# Patient Record
Sex: MALE | Race: BLACK OR AFRICAN AMERICAN | NOT HISPANIC OR LATINO | Employment: UNEMPLOYED | URBAN - METROPOLITAN AREA
[De-identification: names, ages, dates, MRNs, and addresses within clinical notes are randomized per-mention and may not be internally consistent; named-entity substitution may affect disease eponyms.]

---

## 2020-01-07 ENCOUNTER — HOSPITAL ENCOUNTER (EMERGENCY)
Facility: HOSPITAL | Age: 50
Discharge: HOME | End: 2020-01-07
Attending: SURGERY

## 2020-01-07 VITALS
OXYGEN SATURATION: 98 % | HEART RATE: 55 BPM | WEIGHT: 144.81 LBS | TEMPERATURE: 97 F | DIASTOLIC BLOOD PRESSURE: 72 MMHG | BODY MASS INDEX: 19.64 KG/M2 | SYSTOLIC BLOOD PRESSURE: 117 MMHG | RESPIRATION RATE: 19 BRPM

## 2020-01-07 DIAGNOSIS — F19.10 SUBSTANCE ABUSE: Primary | ICD-10-CM

## 2020-01-07 LAB
ALBUMIN SERPL BCP-MCNC: 3.5 G/DL (ref 3.5–5.2)
ALP SERPL-CCNC: 87 U/L (ref 55–135)
ALT SERPL W/O P-5'-P-CCNC: 20 U/L (ref 10–44)
ANION GAP SERPL CALC-SCNC: 11 MMOL/L (ref 8–16)
AST SERPL-CCNC: 36 U/L (ref 10–40)
BASOPHILS # BLD AUTO: 0.03 K/UL (ref 0–0.2)
BASOPHILS NFR BLD: 0.7 % (ref 0–1.9)
BILIRUB SERPL-MCNC: 0.3 MG/DL (ref 0.1–1)
BUN SERPL-MCNC: 14 MG/DL (ref 6–20)
CALCIUM SERPL-MCNC: 8.3 MG/DL (ref 8.7–10.5)
CHLORIDE SERPL-SCNC: 105 MMOL/L (ref 95–110)
CK SERPL-CCNC: 659 U/L (ref 20–200)
CO2 SERPL-SCNC: 25 MMOL/L (ref 23–29)
CREAT SERPL-MCNC: 0.9 MG/DL (ref 0.5–1.4)
DIFFERENTIAL METHOD: ABNORMAL
EOSINOPHIL # BLD AUTO: 0 K/UL (ref 0–0.5)
EOSINOPHIL NFR BLD: 0.9 % (ref 0–8)
ERYTHROCYTE [DISTWIDTH] IN BLOOD BY AUTOMATED COUNT: 15.2 % (ref 11.5–14.5)
EST. GFR  (AFRICAN AMERICAN): >60 ML/MIN/1.73 M^2
EST. GFR  (NON AFRICAN AMERICAN): >60 ML/MIN/1.73 M^2
GLUCOSE SERPL-MCNC: 123 MG/DL (ref 70–110)
HCT VFR BLD AUTO: 36.6 % (ref 40–54)
HGB BLD-MCNC: 11.9 G/DL (ref 14–18)
IMM GRANULOCYTES # BLD AUTO: 0.01 K/UL (ref 0–0.04)
IMM GRANULOCYTES NFR BLD AUTO: 0.2 % (ref 0–0.5)
LYMPHOCYTES # BLD AUTO: 0.9 K/UL (ref 1–4.8)
LYMPHOCYTES NFR BLD: 19.8 % (ref 18–48)
MCH RBC QN AUTO: 28.1 PG (ref 27–31)
MCHC RBC AUTO-ENTMCNC: 32.5 G/DL (ref 32–36)
MCV RBC AUTO: 86 FL (ref 82–98)
MONOCYTES # BLD AUTO: 0.5 K/UL (ref 0.3–1)
MONOCYTES NFR BLD: 10.2 % (ref 4–15)
NEUTROPHILS # BLD AUTO: 3.1 K/UL (ref 1.8–7.7)
NEUTROPHILS NFR BLD: 68.2 % (ref 38–73)
NRBC BLD-RTO: 0 /100 WBC
PLATELET # BLD AUTO: 233 K/UL (ref 150–350)
PMV BLD AUTO: 9 FL (ref 9.2–12.9)
POTASSIUM SERPL-SCNC: 3.2 MMOL/L (ref 3.5–5.1)
PROT SERPL-MCNC: 6.4 G/DL (ref 6–8.4)
RBC # BLD AUTO: 4.24 M/UL (ref 4.6–6.2)
SODIUM SERPL-SCNC: 141 MMOL/L (ref 136–145)
WBC # BLD AUTO: 4.5 K/UL (ref 3.9–12.7)

## 2020-01-07 PROCEDURE — 85025 COMPLETE CBC W/AUTO DIFF WBC: CPT

## 2020-01-07 PROCEDURE — 63600175 PHARM REV CODE 636 W HCPCS: Performed by: SURGERY

## 2020-01-07 PROCEDURE — 99284 EMERGENCY DEPT VISIT MOD MDM: CPT | Mod: 25

## 2020-01-07 PROCEDURE — 80053 COMPREHEN METABOLIC PANEL: CPT

## 2020-01-07 PROCEDURE — 36415 COLL VENOUS BLD VENIPUNCTURE: CPT

## 2020-01-07 PROCEDURE — 82550 ASSAY OF CK (CPK): CPT

## 2020-01-07 PROCEDURE — 96360 HYDRATION IV INFUSION INIT: CPT

## 2020-01-07 RX ORDER — SODIUM CHLORIDE 9 MG/ML
1000 INJECTION, SOLUTION INTRAVENOUS
Status: COMPLETED | OUTPATIENT
Start: 2020-01-07 | End: 2020-01-07

## 2020-01-07 RX ADMIN — SODIUM CHLORIDE 1000 ML: 0.9 INJECTION, SOLUTION INTRAVENOUS at 02:01

## 2020-01-07 NOTE — ED NOTES
NAD noted.  Hourly rounding performed: pain unchanged, plan of care, and possible time of disposition discussed.  Cont to monitor.  Awaiting provider evaluation.  Pt offers no additional complaints @ this time. Pt reports he is ready to go home, MD notified.

## 2020-01-07 NOTE — ED NOTES
Pt given urine speciman cup, zeeshan soap towelettewipe, and instructions for [facility]; understanding verbalized

## 2020-01-07 NOTE — ED PROVIDER NOTES
Encounter Date: 1/7/2020       History     Chief Complaint   Patient presents with    Seizures     possible     Patient is 49-year-old black male who was found reportedly drinking and possibly abusing substances, witnesses say he fell off of a stool and was lethargic.  He has a history of seizure disorder but no actual documented seizure was witnessed according to the EMS personnel.  He has a history of alcohol and substance abuse.        Review of patient's allergies indicates:  No Known Allergies  Past Medical History:   Diagnosis Date    GERD (gastroesophageal reflux disease)      History reviewed. No pertinent surgical history.  History reviewed. No pertinent family history.  Social History     Tobacco Use    Smoking status: Current Every Day Smoker     Packs/day: 0.50     Years: 15.00     Pack years: 7.50     Types: Cigarettes    Smokeless tobacco: Never Used   Substance Use Topics    Alcohol use: Yes     Comment: once a month    Drug use: No     Review of Systems   Unable to perform ROS: Acuity of condition       Physical Exam     Initial Vitals [01/07/20 1427]   BP Pulse Resp Temp SpO2   118/71 75 20 97.1 °F (36.2 °C) 97 %      MAP       --         Physical Exam    Nursing note and vitals reviewed.  Constitutional: He appears well-developed and well-nourished.   HENT:   Head: Normocephalic and atraumatic.   Eyes: EOM are normal. Pupils are equal, round, and reactive to light.   Neck: Normal range of motion. Neck supple.   Cardiovascular: Normal rate.   Pulmonary/Chest: Breath sounds normal. No respiratory distress. He has no wheezes.   Abdominal: Soft. He exhibits no distension. There is no tenderness.   Musculoskeletal: Normal range of motion.   Neurological: He is alert and oriented to person, place, and time.   Skin: Skin is warm and dry. Capillary refill takes less than 2 seconds. No pallor.   Psychiatric: He has a normal mood and affect. Thought content normal.         ED Course   Procedures  Labs  Reviewed - No data to display       Imaging Results    None                                          Clinical Impression:       ICD-10-CM ICD-9-CM   1. Substance abuse F19.10 305.90         Disposition:   Disposition: Discharged  Condition: Stable                     Arron Meier Jr., MD  01/07/20 2158

## 2020-01-07 NOTE — ED TRIAGE NOTES
49 y.o. male presents to ER ED 01/ED 01A   Chief Complaint   Patient presents with    Seizures     possible   Pt brought to ER after witnesses report patient fell out of chair and began having seizure like activity. EMS reports possible ETOH intoxication. Pt AAOx4. No acute distress noted.

## 2020-12-07 ENCOUNTER — HOSPITAL ENCOUNTER (EMERGENCY)
Facility: HOSPITAL | Age: 50
Discharge: HOME | End: 2020-12-07
Attending: SURGERY

## 2020-12-07 VITALS
OXYGEN SATURATION: 98 % | DIASTOLIC BLOOD PRESSURE: 80 MMHG | BODY MASS INDEX: 21.06 KG/M2 | RESPIRATION RATE: 18 BRPM | WEIGHT: 150.44 LBS | TEMPERATURE: 98 F | HEIGHT: 71 IN | SYSTOLIC BLOOD PRESSURE: 116 MMHG | HEART RATE: 88 BPM

## 2020-12-07 DIAGNOSIS — B35.3 TINEA PEDIS OF RIGHT FOOT: Primary | ICD-10-CM

## 2020-12-07 PROCEDURE — 99284 EMERGENCY DEPT VISIT MOD MDM: CPT

## 2020-12-07 RX ORDER — FLUCONAZOLE 100 MG/1
100 TABLET ORAL DAILY
Qty: 5 TABLET | Refills: 0 | Status: SHIPPED | OUTPATIENT
Start: 2020-12-07 | End: 2020-12-12

## 2020-12-07 NOTE — ED PROVIDER NOTES
[facility]                                                 I reviewed the ER triage nurse's note before evaluating the patient    Chief Complaint  50 y.o. male with Foot Swelling (50 yr old male to ED with c/o right foot swelling and pain. )      History of Present Illness  Osmani Mcmahon presents to the emergency room with toe rash/irritation  States he has a toe rash in irritation and his right foot area this past wk  The patient on exam has uncomplicated athlete's foot in his web spaces  Patient has no evidence of cellulitis, no history of diabetes noted today  Neuro intact with good distal pulses, admits to poor foot washing habits    The history is provided by the patient   device was not used during this ER visit  Medical history:  GERD  History reviewed. No pertinent surgical history.   No Known Allergies     I have reviewed all of this patient's past medical, surgical, family, and social   histories as well as active allergies and medications documented in the  electronic medical record    Review of Systems and Physical Exam      Review of Systems  -- Constitution - no fever, denies fatigue, no weakness, no chills  -- Eyes - no tearing or redness, no visual disturbance  -- Ear, Nose - no tinnitus or earache, no nasal congestion or discharge  -- Mouth,Throat - no sore throat, no toothache, normal voice, normal swallowing  -- Respiratory - denies cough and congestion, no shortness of breath, no EVANS  -- Cardiovascular - denies chest pain, no palpitations, denies claudication  -- Gastrointestinal - denies abdominal pain, nausea, vomiting, or diarrhea  -- Genitourinary - no dysuria, denies flank pain, no hematuria, no STD risk  -- Musculoskeletal - denies back pain, negative for trauma or injury  -- Neurological - no headache, denies weakness or seizure; no LOC  -- Skin - rash on the right foot    Vital Signs  His oral temperature is 97.6 °F (36.4 °C).   His blood  pressure is 116/80 and his pulse is 88.   His respiration is 18 and oxygen saturation is 98%.     Physical Exam  -- Nursing note and vitals reviewed  -- Constitutional: Appears well-developed and well-nourished  -- Head: Atraumatic. Normocephalic. No obvious abnormality  -- Eyes: Pupils are equal and reactive to light. Normal conjunctiva and lids  -- Cardiac: Normal rate, regular rhythm and normal heart sounds  -- Pulmonary: Normal respiratory effort, breath sounds clear to auscultation  -- Abdominal: Soft, no tenderness. Normal bowel sounds. Normal liver edge  -- Musculoskeletal: Normal range of motion, no effusions. Joints stable   -- Neurological: No focal deficits. Showed good interaction with staff  -- Vascular: Posterior tibial, dorsalis pedis and radial pulses 2+ bilaterally    -- Lymphatics: No cervical or peripheral lymphadenopathy. No edema noted  -- Skin: Athlete's foot in the interdigit web spaces of the right foot    Emergency Room Course      ED Physician Management  -- Diagnosis management comments: 50 y.o. male with athlete's foot  -- patient with athlete's foot in the right interdigit web spaces on exam  -- patient has no signs of cellulitis complication or skin sloughing now  -- patient has poor foot hygiene based on presentation this morning  -- Diflucan and Lamisil prescribed on discharge, close follow-up needed  -- given local name and number of a physician for outpatient evaluation  -- return to the ER with any concerning symptoms after discharge today    Diagnosis  [B35.3] Tinea pedis of right foot (Primary)    Disposition and Plan  -- Disposition: home  -- Condition: stable  -- Follow-up: Patient to follow up with MD in 1-2 days.  -- I advised the patient that we have found no life threatening condition today  -- At this time, I believe the patient is clinically stable for discharge.   -- The patient acknowledges that close follow up with a MD is required   -- Patient agrees to comply with  all instruction and direction given in the ER    This note is dictated on M*Modal word recognition program.  There are word recognition mistakes that are occasionally missed on review.         Vinh Zhou MD  12/07/20 [id number]

## 2021-12-29 ENCOUNTER — PATIENT OUTREACH (OUTPATIENT)
Dept: ADMINISTRATIVE | Facility: HOSPITAL | Age: 51
End: 2021-12-29

## 2022-04-13 DIAGNOSIS — Z12.11 COLON CANCER SCREENING: ICD-10-CM

## 2023-06-02 ENCOUNTER — PATIENT OUTREACH (OUTPATIENT)
Dept: ADMINISTRATIVE | Facility: HOSPITAL | Age: 53
End: 2023-06-02

## 2025-03-27 PROBLEM — K92.0 HEMATEMESIS: Status: ACTIVE | Noted: 2025-03-27

## 2025-05-12 ENCOUNTER — HOSPITAL ENCOUNTER (EMERGENCY)
Facility: HOSPITAL | Age: 55
Discharge: HOME | End: 2025-05-12
Attending: SURGERY
Payer: MEDICAID

## 2025-05-12 VITALS
SYSTOLIC BLOOD PRESSURE: 179 MMHG | OXYGEN SATURATION: 100 % | HEART RATE: 62 BPM | WEIGHT: 157.5 LBS | TEMPERATURE: 98 F | HEIGHT: 73 IN | DIASTOLIC BLOOD PRESSURE: 100 MMHG | BODY MASS INDEX: 20.87 KG/M2 | RESPIRATION RATE: 20 BRPM

## 2025-05-12 DIAGNOSIS — K92.0 HEMATEMESIS: ICD-10-CM

## 2025-05-12 DIAGNOSIS — R06.02 SHORTNESS OF BREATH: ICD-10-CM

## 2025-05-12 LAB
ABSOLUTE EOSINOPHIL (OHS): 0.02 K/UL
ABSOLUTE EOSINOPHIL (OHS): 0.03 K/UL
ABSOLUTE MONOCYTE (OHS): 0.6 K/UL (ref 0.3–1)
ABSOLUTE MONOCYTE (OHS): 0.77 K/UL (ref 0.3–1)
ABSOLUTE NEUTROPHIL COUNT (OHS): 3.24 K/UL (ref 1.8–7.7)
ABSOLUTE NEUTROPHIL COUNT (OHS): 3.31 K/UL (ref 1.8–7.7)
ALBUMIN SERPL BCP-MCNC: 4.2 G/DL (ref 3.5–5.2)
ALP SERPL-CCNC: 81 UNIT/L (ref 40–150)
ALT SERPL W/O P-5'-P-CCNC: 21 UNIT/L (ref 10–44)
AMPHET UR QL SCN: ABNORMAL
ANION GAP (OHS): 9 MMOL/L (ref 8–16)
APTT PPP: 23.5 SECONDS (ref 21–32)
AST SERPL-CCNC: 32 UNIT/L (ref 11–45)
BACTERIA #/AREA URNS HPF: ABNORMAL /HPF
BARBITURATE SCN PRESENT UR: NEGATIVE
BASOPHILS # BLD AUTO: 0.03 K/UL
BASOPHILS # BLD AUTO: 0.04 K/UL
BASOPHILS NFR BLD AUTO: 0.6 %
BASOPHILS NFR BLD AUTO: 0.7 %
BENZODIAZ UR QL SCN: NEGATIVE
BILIRUB SERPL-MCNC: 1 MG/DL (ref 0.1–1)
BILIRUB UR QL STRIP.AUTO: ABNORMAL
BNP SERPL-MCNC: 17 PG/ML (ref 0–99)
BUN SERPL-MCNC: 9 MG/DL (ref 6–20)
CALCIUM SERPL-MCNC: 9.2 MG/DL (ref 8.7–10.5)
CANNABINOIDS UR QL SCN: NEGATIVE
CHLORIDE SERPL-SCNC: 101 MMOL/L (ref 95–110)
CLARITY UR: CLEAR
CO2 SERPL-SCNC: 29 MMOL/L (ref 23–29)
COCAINE UR QL SCN: ABNORMAL
COLOR UR AUTO: ABNORMAL
CREAT SERPL-MCNC: 0.8 MG/DL (ref 0.5–1.4)
CREAT UR-MCNC: 394.4 MG/DL (ref 23–375)
ERYTHROCYTE [DISTWIDTH] IN BLOOD BY AUTOMATED COUNT: 15.8 % (ref 11.5–14.5)
ERYTHROCYTE [DISTWIDTH] IN BLOOD BY AUTOMATED COUNT: 15.9 % (ref 11.5–14.5)
ETHANOL SERPL-MCNC: <10 MG/DL
GFR SERPLBLD CREATININE-BSD FMLA CKD-EPI: >60 ML/MIN/1.73/M2
GLUCOSE SERPL-MCNC: 100 MG/DL (ref 70–110)
GLUCOSE UR QL STRIP: NEGATIVE
HCT VFR BLD AUTO: 37.2 % (ref 40–54)
HCT VFR BLD AUTO: 39.7 % (ref 40–54)
HGB BLD-MCNC: 12.7 GM/DL (ref 14–18)
HGB BLD-MCNC: 13.3 GM/DL (ref 14–18)
HGB UR QL STRIP: ABNORMAL
HOLD SPECIMEN: NORMAL
HYALINE CASTS #/AREA URNS LPF: 0 /LPF (ref 0–1)
IMM GRANULOCYTES # BLD AUTO: 0.02 K/UL (ref 0–0.04)
IMM GRANULOCYTES # BLD AUTO: 0.02 K/UL (ref 0–0.04)
IMM GRANULOCYTES NFR BLD AUTO: 0.3 % (ref 0–0.5)
IMM GRANULOCYTES NFR BLD AUTO: 0.4 % (ref 0–0.5)
INFLUENZA A MOLECULAR (OHS): NEGATIVE
INFLUENZA B MOLECULAR (OHS): NEGATIVE
INR PPP: 1 (ref 0.8–1.2)
KETONES UR QL STRIP: ABNORMAL
LACTATE SERPL-SCNC: 1 MMOL/L (ref 0.5–2.2)
LEUKOCYTE ESTERASE UR QL STRIP: NEGATIVE
LIPASE SERPL-CCNC: 7 U/L (ref 4–60)
LYMPHOCYTES # BLD AUTO: 1.27 K/UL (ref 1–4.8)
LYMPHOCYTES # BLD AUTO: 1.78 K/UL (ref 1–4.8)
MCH RBC QN AUTO: 27.5 PG (ref 27–31)
MCH RBC QN AUTO: 28 PG (ref 27–31)
MCHC RBC AUTO-ENTMCNC: 33.5 G/DL (ref 32–36)
MCHC RBC AUTO-ENTMCNC: 34.1 G/DL (ref 32–36)
MCV RBC AUTO: 82 FL (ref 82–98)
MCV RBC AUTO: 82 FL (ref 82–98)
METHADONE UR QL SCN: NEGATIVE
MICROSCOPIC COMMENT: ABNORMAL
NITRITE UR QL STRIP: NEGATIVE
NUCLEATED RBC (/100WBC) (OHS): 0 /100 WBC
NUCLEATED RBC (/100WBC) (OHS): 0 /100 WBC
OPIATES UR QL SCN: NEGATIVE
PCP UR QL: NEGATIVE
PH UR STRIP: 7 [PH]
PLATELET # BLD AUTO: 222 K/UL (ref 150–450)
PLATELET # BLD AUTO: 240 K/UL (ref 150–450)
PMV BLD AUTO: 9.6 FL (ref 9.2–12.9)
PMV BLD AUTO: 9.8 FL (ref 9.2–12.9)
POTASSIUM SERPL-SCNC: 3 MMOL/L (ref 3.5–5.1)
PROCALCITONIN SERPL-MCNC: 0.02 NG/ML
PROT SERPL-MCNC: 7.7 GM/DL (ref 6–8.4)
PROT UR QL STRIP: ABNORMAL
PROTHROMBIN TIME: 10.9 SECONDS (ref 9–12.5)
RBC # BLD AUTO: 4.53 M/UL (ref 4.6–6.2)
RBC # BLD AUTO: 4.83 M/UL (ref 4.6–6.2)
RBC #/AREA URNS HPF: 15 /HPF (ref 0–4)
RELATIVE EOSINOPHIL (OHS): 0.4 %
RELATIVE EOSINOPHIL (OHS): 0.5 %
RELATIVE LYMPHOCYTE (OHS): 24.2 % (ref 18–48)
RELATIVE LYMPHOCYTE (OHS): 30.3 % (ref 18–48)
RELATIVE MONOCYTE (OHS): 11.4 % (ref 4–15)
RELATIVE MONOCYTE (OHS): 13.1 % (ref 4–15)
RELATIVE NEUTROPHIL (OHS): 55.1 % (ref 38–73)
RELATIVE NEUTROPHIL (OHS): 63 % (ref 38–73)
SARS-COV-2 RDRP RESP QL NAA+PROBE: NEGATIVE
SODIUM SERPL-SCNC: 139 MMOL/L (ref 136–145)
SP GR UR STRIP: 1.02
TROPONIN I SERPL DL<=0.01 NG/ML-MCNC: <0.006 NG/ML
UROBILINOGEN UR STRIP-ACNC: ABNORMAL EU/DL
WBC # BLD AUTO: 5.25 K/UL (ref 3.9–12.7)
WBC # BLD AUTO: 5.88 K/UL (ref 3.9–12.7)
WBC #/AREA URNS HPF: 2 /HPF (ref 0–5)

## 2025-05-12 PROCEDURE — 96365 THER/PROPH/DIAG IV INF INIT: CPT

## 2025-05-12 PROCEDURE — 25000003 PHARM REV CODE 250: Performed by: SURGERY

## 2025-05-12 PROCEDURE — 87040 BLOOD CULTURE FOR BACTERIA: CPT | Performed by: SURGERY

## 2025-05-12 PROCEDURE — 96366 THER/PROPH/DIAG IV INF ADDON: CPT

## 2025-05-12 PROCEDURE — 83605 ASSAY OF LACTIC ACID: CPT | Performed by: SURGERY

## 2025-05-12 PROCEDURE — 63600175 PHARM REV CODE 636 W HCPCS: Performed by: SURGERY

## 2025-05-12 PROCEDURE — 85730 THROMBOPLASTIN TIME PARTIAL: CPT | Performed by: SURGERY

## 2025-05-12 PROCEDURE — 80053 COMPREHEN METABOLIC PANEL: CPT | Performed by: SURGERY

## 2025-05-12 PROCEDURE — 81003 URINALYSIS AUTO W/O SCOPE: CPT | Mod: 59 | Performed by: SURGERY

## 2025-05-12 PROCEDURE — 80307 DRUG TEST PRSMV CHEM ANLYZR: CPT | Performed by: SURGERY

## 2025-05-12 PROCEDURE — 25500020 PHARM REV CODE 255: Performed by: SURGERY

## 2025-05-12 PROCEDURE — 84484 ASSAY OF TROPONIN QUANT: CPT | Performed by: SURGERY

## 2025-05-12 PROCEDURE — 87502 INFLUENZA DNA AMP PROBE: CPT | Performed by: SURGERY

## 2025-05-12 PROCEDURE — 85610 PROTHROMBIN TIME: CPT | Performed by: SURGERY

## 2025-05-12 PROCEDURE — 85025 COMPLETE CBC W/AUTO DIFF WBC: CPT | Performed by: SURGERY

## 2025-05-12 PROCEDURE — 84145 PROCALCITONIN (PCT): CPT | Performed by: SURGERY

## 2025-05-12 PROCEDURE — 83880 ASSAY OF NATRIURETIC PEPTIDE: CPT | Performed by: SURGERY

## 2025-05-12 PROCEDURE — 96375 TX/PRO/DX INJ NEW DRUG ADDON: CPT

## 2025-05-12 PROCEDURE — 82077 ASSAY SPEC XCP UR&BREATH IA: CPT | Performed by: SURGERY

## 2025-05-12 PROCEDURE — 99285 EMERGENCY DEPT VISIT HI MDM: CPT | Mod: 25

## 2025-05-12 PROCEDURE — 93010 ELECTROCARDIOGRAM REPORT: CPT | Mod: ,,, | Performed by: INTERNAL MEDICINE

## 2025-05-12 PROCEDURE — 83690 ASSAY OF LIPASE: CPT | Performed by: SURGERY

## 2025-05-12 PROCEDURE — 85025 COMPLETE CBC W/AUTO DIFF WBC: CPT | Performed by: NURSE PRACTITIONER

## 2025-05-12 PROCEDURE — 25000003 PHARM REV CODE 250: Performed by: NURSE PRACTITIONER

## 2025-05-12 PROCEDURE — U0002 COVID-19 LAB TEST NON-CDC: HCPCS | Performed by: SURGERY

## 2025-05-12 PROCEDURE — 93005 ELECTROCARDIOGRAM TRACING: CPT

## 2025-05-12 RX ORDER — ALUMINUM HYDROXIDE, MAGNESIUM HYDROXIDE, AND SIMETHICONE 1200; 120; 1200 MG/30ML; MG/30ML; MG/30ML
30 SUSPENSION ORAL ONCE
Status: COMPLETED | OUTPATIENT
Start: 2025-05-12 | End: 2025-05-12

## 2025-05-12 RX ORDER — PANTOPRAZOLE SODIUM 40 MG/1
40 TABLET, DELAYED RELEASE ORAL DAILY
Qty: 30 TABLET | Refills: 0 | Status: SHIPPED | OUTPATIENT
Start: 2025-05-12 | End: 2025-06-11

## 2025-05-12 RX ORDER — LIDOCAINE HYDROCHLORIDE 20 MG/ML
15 SOLUTION OROPHARYNGEAL ONCE
Status: COMPLETED | OUTPATIENT
Start: 2025-05-12 | End: 2025-05-12

## 2025-05-12 RX ORDER — PANTOPRAZOLE SODIUM 40 MG/10ML
40 INJECTION, POWDER, LYOPHILIZED, FOR SOLUTION INTRAVENOUS
Status: COMPLETED | OUTPATIENT
Start: 2025-05-12 | End: 2025-05-12

## 2025-05-12 RX ORDER — POTASSIUM CHLORIDE 20 MEQ/1
40 TABLET, EXTENDED RELEASE ORAL
Status: COMPLETED | OUTPATIENT
Start: 2025-05-12 | End: 2025-05-12

## 2025-05-12 RX ADMIN — POTASSIUM CHLORIDE 40 MEQ: 1500 TABLET, EXTENDED RELEASE ORAL at 06:05

## 2025-05-12 RX ADMIN — IOHEXOL 75 ML: 350 INJECTION, SOLUTION INTRAVENOUS at 04:05

## 2025-05-12 RX ADMIN — PANTOPRAZOLE SODIUM 8 MG/HR: 40 INJECTION, POWDER, FOR SOLUTION INTRAVENOUS at 04:05

## 2025-05-12 RX ADMIN — LIDOCAINE HYDROCHLORIDE 15 ML: 20 SOLUTION ORAL at 06:05

## 2025-05-12 RX ADMIN — PANTOPRAZOLE SODIUM 40 MG: 40 INJECTION, POWDER, FOR SOLUTION INTRAVENOUS at 04:05

## 2025-05-12 RX ADMIN — ALUMINUM HYDROXIDE, MAGNESIUM HYDROXIDE, AND DIMETHICONE 30 ML: 200; 20; 200 SUSPENSION ORAL at 06:05

## 2025-05-12 NOTE — ED TRIAGE NOTES
Pt arrived to ED c/o vomiting blood (dark red) that started 3 days ago. Pt also reports intermittent shortness of breath. LBM: 05/11/25. PT reports he is unable to keep anything down.

## 2025-05-12 NOTE — ED PROVIDER NOTES
Encounter Date: 5/12/2025       History     Chief Complaint   Patient presents with    Hematemesis     Pt arrived to ED c/o vomiting blood (dark red) that started 3 days ago. Pt also reports intermittent shortness of breath. LBM: 05/11/25. PT reports he is unable to keep anything down.     Osmani Mcmahon is a 54 y.o. male with PMH of GERD presenting to the ED for evaluation of vomiting.  Patient presents with a 2 week history of nausea with vomiting. He reports that he is unable to tolerate PO intake. He vomits throughout the day and started vomiting dark, red blood 3 days ago. He is having pain in his throat that he believes is due to vomiting.  Denies chest pain or SOB.  He reports mild epigastric pain.  Denies loose stool fever, or sick contacts at home.  Of note, hospitalization for hematemesis in 03/25'> EGD performed LA Grade C reflux esophagitis with no bleeding.                          - Small hiatal hernia.                          - Normal stomach.                          - Normal examined duodenum.                          - No specimens collected.       The history is provided by the patient.     Review of patient's allergies indicates:  No Known Allergies  Past Medical History:   Diagnosis Date    GERD (gastroesophageal reflux disease)      Past Surgical History:   Procedure Laterality Date    ESOPHAGOGASTRODUODENOSCOPY N/A 3/28/2025    Procedure: EGD (ESOPHAGOGASTRODUODENOSCOPY);  Surgeon: Shoaib Deras MD;  Location: [facility];  Service: Endoscopy;  Laterality: N/A;     Family History   Problem Relation Name Age of Onset    No Known Problems Mother      No Known Problems Father       Social History[1]  Review of Systems   Constitutional:  Negative for appetite change, chills and fever.   HENT:  Negative for congestion, ear discharge, ear pain, postnasal drip, rhinorrhea and sore throat.    Respiratory:  Negative for cough, chest tightness and shortness of breath.    Cardiovascular:   Negative for chest pain.   Gastrointestinal:  Positive for nausea and vomiting. Negative for abdominal distention and abdominal pain.   Genitourinary:  Negative for dysuria, flank pain, hematuria and urgency.   Musculoskeletal:  Negative for arthralgias and back pain.   Skin:  Negative for rash.   Neurological:  Negative for dizziness, weakness, numbness and headaches.   Hematological:  Does not bruise/bleed easily.       Physical Exam     Initial Vitals [05/12/25 1434]   BP Pulse Resp Temp SpO2   (!) 169/92 78 18 100 °F (37.8 °C) 98 %      MAP       --         Physical Exam    Nursing note and vitals reviewed.  Constitutional: He appears well-developed and well-nourished.   HENT:   Head: Normocephalic and atraumatic.   Eyes: Conjunctivae and EOM are normal. Pupils are equal, round, and reactive to light.   Neck: Neck supple.   Cardiovascular:  Normal rate, regular rhythm, normal heart sounds and intact distal pulses.           Pulmonary/Chest: Breath sounds normal.   Abdominal: Abdomen is soft. Bowel sounds are normal.   Musculoskeletal:         General: Normal range of motion.      Cervical back: Neck supple.     Neurological: He is alert and oriented to person, place, and time. He has normal strength.   Skin: Skin is warm and dry. Capillary refill takes less than 2 seconds.   Psychiatric: He has a normal mood and affect. His behavior is normal. Judgment and thought content normal.         ED Course   Procedures  Labs Reviewed   COMPREHENSIVE METABOLIC PANEL - Abnormal       Result Value    Sodium 139      Potassium 3.0 (*)     Chloride 101      CO2 29      Glucose 100      BUN 9      Creatinine 0.8      Calcium 9.2      Protein Total 7.7      Albumin 4.2      Bilirubin Total 1.0      ALP 81      AST 32      ALT 21      Anion Gap 9      eGFR >60     URINALYSIS, REFLEX TO URINE CULTURE - Abnormal    Color, UA Nara      Appearance, UA Clear      pH, UA 7.0      Spec Grav UA 1.020      Protein, UA 1+ (*)      "Glucose, UA Negative      Ketones, UA Trace (*)     Bilirubin, UA 1+ (*)     Blood, UA 2+ (*)     Nitrites, UA Negative      Urobilinogen, UA 2.0-3.0 (*)     Leukocyte Esterase, UA Negative     DRUG SCREEN PANEL, URINE EMERGENCY - Abnormal    Benzodiazepine, Urine Negative      Methadone, Urine Negative      Cocaine, Urine Presumptive Positive (*)     Opiates, Urine Negative      Barbiturates, Urine Negative      Amphetamines, Urine Presumptive Positive (*)     THC Negative      Phencyclidine, Urine Negative      Urine Creatinine 394.4 (*)     Narrative:     This screen includes the following classes of drugs at the listed cut-off:     Benzodiazepines:        200 ng/ml   Methadone:              300 ng/ml   Cocaine metabolite:     300 ng/ml   Opiates:                300 ng/ml   Barbiturates:           200 ng/ml   Amphetamines:           1000 ng/ml   Marijuana metabs (THC): 50 ng/ml   Phencyclidine (PCP):    25 ng/ml     This is a screening test. If results do not correlate with clinical presentation, then a confirmatory send out test is advised.    This report is intended for use in clinical monitoring and management of patients. It is not intended for use in employment related drug testing."   CBC WITH DIFFERENTIAL - Abnormal    WBC 5.25      RBC 4.83      HGB 13.3 (*)     HCT 39.7 (*)     MCV 82      MCH 27.5      MCHC 33.5      RDW 15.8 (*)     Platelet Count 240      MPV 9.6      Nucleated RBC 0      Neut % 63.0      Lymph % 24.2      Mono % 11.4      Eos % 0.4      Basophil % 0.6      Imm Grans % 0.4      Neut # 3.31      Lymph # 1.27      Mono # 0.60      Eos # 0.02      Baso # 0.03      Imm Grans # 0.02     URINALYSIS MICROSCOPIC - Abnormal    RBC, UA 15 (*)     WBC, UA 2      Bacteria, UA Occasional      Hyaline Casts, UA 0      Microscopic Comment mucus present     CBC WITH DIFFERENTIAL - Abnormal    WBC 5.88      RBC 4.53 (*)     HGB 12.7 (*)     HCT 37.2 (*)     MCV 82      MCH 28.0      MCHC 34.1      RDW " 15.9 (*)     Platelet Count 222      MPV 9.8      Nucleated RBC 0      Neut % 55.1      Lymph % 30.3      Mono % 13.1      Eos % 0.5      Basophil % 0.7      Imm Grans % 0.3      Neut # 3.24      Lymph # 1.78      Mono # 0.77      Eos # 0.03      Baso # 0.04      Imm Grans # 0.02     INFLUENZA A & B BY MOLECULAR - Normal    INFLUENZA A MOLECULAR Negative      INFLUENZA B MOLECULAR  Negative     SARS-COV-2 RNA AMPLIFICATION, QUAL - Normal    SARS COV-2 Molecular Negative     TROPONIN I - Normal    Troponin-I <0.006     B-TYPE NATRIURETIC PEPTIDE - Normal    BNP 17     LACTIC ACID, PLASMA - Normal    Lactic Acid Level 1.0      Narrative:     Falsely low lactic acid results can be found in samples containing >=13.0 mg/dL total bilirubin and/or >=3.5 mg/dL direct bilirubin.    PROCALCITONIN - Normal    Procalcitonin 0.02     LIPASE - Normal    Lipase Level 7     APTT - Normal    PTT 23.5     PROTIME-INR - Normal    PT 10.9      INR 1.0     ALCOHOL,MEDICAL (ETHANOL) - Normal    Alcohol, Serum <10     CULTURE, BLOOD   CULTURE, BLOOD   CBC W/ AUTO DIFFERENTIAL    Narrative:     The following orders were created for panel order CBC Auto Differential.  Procedure                               Abnormality         Status                     ---------                               -----------         ------                     CBC with Differential[[id number]]       Abnormal            Final result                 Please view results for these tests on the individual orders.   GREY TOP URINE HOLD    Extra Tube Hold for add-ons.     CBC W/ AUTO DIFFERENTIAL    Narrative:     The following orders were created for panel order CBC auto differential.  Procedure                               Abnormality         Status                     ---------                               -----------         ------                     CBC with Differential[[id number]]       Abnormal            Final result                 Please view results for  these tests on the individual orders.          Imaging Results              CT Abdomen Pelvis With IV Contrast NO Oral Contrast (Final result)  Result time 05/12/25 17:13:32      Final result by Taylor Fletcher MD (05/12/25 17:13:32)                   Narrative:    EXAMINATION:  CT ABDOMEN PELVIS WITH IV CONTRAST    CLINICAL HISTORY:  Epigastric pain;    TECHNIQUE:  Low dose axial images, sagittal and coronal reformations were obtained from the lung bases to the pubic symphysis following the IV administration of 75 mL of Omnipaque 350 no p.o. contrast    COMPARISON:  03/27/2025    FINDINGS:  6 mm right lower lobe nodule series 2, image 6 not significantly changed.    2.6 cm lesion left lobe of the liver and 2.2 cm lesion right lobe of the liver consistent hemangiomas not significantly changed.  No calcified stones the gallbladder or CT findings of acute cholecystitis and no biliary duct dilatation.  The spleen is not enlarged the adrenal glands in the pancreas unremarkable appearance.  The abdominal aorta tapers without aneurysmal dilatation.  Normal appearance of the appendix.  Symmetrical renal enhancement and no hydronephrosis.  Urinary bladder mildly distended at time of the exam.  Mild diffuse wall thickening versus incomplete distention.  Recommend correlation clinically if clinical consideration for cystitis or chronic bladder outlet obstruction. Prostate gland appears enlarged measuring 4.3 cm transversely.    Bowel extends down low into the neck of right inguinal canal.    Mildly prominent amount of fluid-filled small bowel loops nonspecific questioning very mild enteritis or ileus.  Also mild gastric wall thickening versus incomplete distention questioning mild gastritis.  The appearance of the stomach is similar to the prior exam.  More fluid within the small bowel today.    IMPRESSION    mildly prominent amount of fluid in small bowel questioning very mild enteritis or ileus.  Mild gastric wall  thickening versus incomplete distention suggesting gastritis.  Redemonstration of hepatic lesions remaining consistent hemangiomas.  Enlarged prostate gland, mild diffuse bladder wall thickening.      Electronically signed by: Taylor Fletcher MD  Date:    05/12/2025  Time:    17:13                                     X-Ray Chest 1 View (Final result)  Result time 05/12/25 15:05:38      Final result by Ally Diaz MD (05/12/25 15:05:38)                   Impression:      No acute abnormality.      Electronically signed by: Ally Diaz MD  Date:    05/12/2025  Time:    15:05               Narrative:    EXAMINATION:  XR CHEST 1 VIEW    CLINICAL HISTORY:  Hematemesis;    TECHNIQUE:  Single frontal view of the chest was performed.    COMPARISON:  01/19/2025    FINDINGS:  The lungs are clear with normal appearance of pulmonary vasculature. No pleural effusion. No evident pneumothorax.    The cardiac silhouette is normal in size. The hilar and mediastinal contours are unremarkable.    Bones are intact.                                       Medications   potassium chloride SA CR tablet 40 mEq (has no administration in time range)   pantoprazole injection 40 mg (40 mg Intravenous Given 5/12/25 1612)   pantoprazole (PROTONIX) 40 mg in 0.9% NaCl 100 mL IVPB (MB+) (8 mg/hr Intravenous New Bag 5/12/25 1622)   iohexoL (OMNIPAQUE 350) injection 80 mL (75 mLs Intravenous Given 5/12/25 1641)   aluminum-magnesium hydroxide-simethicone 200-200-20 mg/5 mL suspension 30 mL (30 mLs Oral Given 5/12/25 1816)     And   LIDOcaine viscous HCl 2% oral solution 15 mL (15 mLs Oral Given 5/12/25 1816)     Medical Decision Making  Evaluation of a 54-year-old male presenting with nausea, vomiting, and hematemesis  Presents nontoxic appearing with stable vital signs  Physical exam with mild epigastric tenderness.  No guarding or signs of peritonitis  Negative Elliott's    Differential diagnosis includes gastritis, GERD, Sandy-Lopez tear, f  upper GI bleed, pancreatitis    Amount and/or Complexity of Data Reviewed  Labs: ordered. Decision-making details documented in ED Course.  Radiology: ordered. Decision-making details documented in ED Course.  ECG/medicine tests: ordered and independent interpretation performed.     Details: Normal sinus rhythm, rate 68.  Normal CT and QRS interval.  No STEMI.  No significant change when compared to EKG of 03/27/2025    Risk  OTC drugs.  Prescription drug management.  Risk Details: Stable for discharge home.  CBC with stable H&H.  No leukocytosis.  CMP with a potassium of 3.0 replaced with 40 mEq of K-Dur.  Negative lactic.  Negative chest x-ray.  CT AP shows enteritis and gastritis.  He was given IV Protonix in his feeling better.  No further episodes of hematemesis in the ED.  Repeat CBC is stable.  Will discharge outpatient with outpatient GI follow-up. Patient/caregiver voices understanding and feels comfortable with discharge plan.      The patient acknowledges that close follow up with medical provider is required. Instructed to follow up with PCP within 2 days. Patient was given specific return precautions. The patient agrees to comply with all instruction and directions given in the ER.                                        Clinical Impression:  Final diagnoses:  [R06.02] Shortness of breath  [K92.0] Hematemesis          ED Disposition Condition    Discharge Stable          ED Prescriptions       Medication Sig Dispense Start Date End Date Auth. Provider    pantoprazole (PROTONIX) 40 MG tablet Take 1 tablet (40 mg total) by mouth once daily. 30 tablet 5/12/2025 6/11/2025 Vinh Zhou MD          Follow-up Information       Follow up With Specialties Details Why Contact Info    Augustus Edwards MD Family Medicine Go in 2 days  [address] [facility] [address]  [phone]      Thomas Vu MD Gastroenterology Go in 2 days  [address] [facility] [address]  [phone]                    [1]   Social History  Tobacco Use    Smoking status: Every Day     Current packs/day: 0.50     Average packs/day: 0.5 packs/day for 15.0 years (7.5 ttl pk-yrs)     Types: Cigarettes    Smokeless tobacco: Never   Substance Use Topics    Alcohol use: Yes     Comment: once a month    Drug use: No        Kenna Rascon NP  05/12/25 [id number]

## 2025-05-13 ENCOUNTER — OFFICE VISIT (OUTPATIENT)
Dept: URGENT CARE | Facility: CLINIC | Age: 55
End: 2025-05-13
Payer: MEDICAID

## 2025-05-13 VITALS
HEIGHT: 73 IN | SYSTOLIC BLOOD PRESSURE: 152 MMHG | BODY MASS INDEX: 20.81 KG/M2 | DIASTOLIC BLOOD PRESSURE: 105 MMHG | OXYGEN SATURATION: 99 % | RESPIRATION RATE: 19 BRPM | TEMPERATURE: 99 F | HEART RATE: 65 BPM | WEIGHT: 157 LBS

## 2025-05-13 DIAGNOSIS — K29.70 GASTRITIS, PRESENCE OF BLEEDING UNSPECIFIED, UNSPECIFIED CHRONICITY, UNSPECIFIED GASTRITIS TYPE: ICD-10-CM

## 2025-05-13 DIAGNOSIS — R06.6 HICCUPS: Primary | ICD-10-CM

## 2025-05-13 DIAGNOSIS — R11.0 NAUSEA: ICD-10-CM

## 2025-05-13 DIAGNOSIS — Z76.89 ENCOUNTER TO ESTABLISH CARE: ICD-10-CM

## 2025-05-13 PROCEDURE — 99203 OFFICE O/P NEW LOW 30 MIN: CPT | Mod: S$GLB,,,

## 2025-05-13 RX ORDER — ONDANSETRON 4 MG/1
4 TABLET, ORALLY DISINTEGRATING ORAL EVERY 8 HOURS PRN
Qty: 20 TABLET | Refills: 0 | Status: SHIPPED | OUTPATIENT
Start: 2025-05-13

## 2025-05-13 NOTE — PROGRESS NOTES
"Subjective:      Patient ID: Osmani Mcmahon is a 54 y.o. male.    Vitals:  height is 6' 1" (1.854 m) and weight is 71.2 kg (157 lb). His oral temperature is 99.2 °F (37.3 °C). His blood pressure is 152/105 (abnormal) and his pulse is 65. His respiration is 19 and oxygen saturation is 99%.     Chief Complaint: Sore Throat    55 y/o male presents to clinic with hiccups for several days with vomiting and sore throat.  He went to [facility] ED yesterday and the medicine they gave him isn't working but he's unsure of the name.  Everything was negative at the hospital.  States his hiccups are making him spit up and become SOB at times.  He was given a referral a few weeks ago to see a gastroenterologist but the GI doctor at [place] does not take his insurance.  He would like to see someone in [place].     Sore Throat   This is a new problem. The current episode started 1 to 4 weeks ago. The problem has been gradually worsening. There has been no fever. The pain is at a severity of 10/10. The pain is severe. Associated symptoms include ear pain, shortness of breath, trouble swallowing and vomiting. Pertinent negatives include no congestion, coughing, diarrhea or headaches. Associated symptoms comments: Hiccuping and spitting up. He has had no exposure to strep or mono.       Constitution: Negative for appetite change and fever.   HENT:  Positive for ear pain, sore throat and trouble swallowing. Negative for congestion.    Respiratory:  Positive for shortness of breath. Negative for cough.    Gastrointestinal:  Positive for vomiting. Negative for constipation and diarrhea.   Neurological:  Negative for headaches.      Objective:     Physical Exam   Constitutional:  Non-toxic appearance. He does not appear ill. No distress. normal  HENT:   Head: Normocephalic and atraumatic.   Ears:   Right Ear: External ear normal.   Left Ear: External ear normal.   Nose: Nose normal.   Eyes: Conjunctivae are normal. "   Cardiovascular: Normal rate.   Pulmonary/Chest: Effort normal.   Abdominal: Normal appearance.   Neurological: He is alert.   Skin: Skin is warm, dry and not diaphoretic.   Psychiatric: His behavior is normal.   Nursing note and vitals reviewed.      Assessment:     1. Hiccups    2. Nausea    3. Gastritis, presence of bleeding unspecified, unspecified chronicity, unspecified gastritis type    4. Encounter to establish care        Plan:       Hiccups    Nausea  -     ondansetron (ZOFRAN-ODT) 4 MG TbDL; Take 1 tablet (4 mg total) by mouth every 8 (eight) hours as needed (nausea).  Dispense: 20 tablet; Refill: 0    Gastritis, presence of bleeding unspecified, unspecified chronicity, unspecified gastritis type  -     Ambulatory referral/consult to Gastroenterology    Encounter to establish care  -     Ambulatory referral/consult to Family Practice      Patient Instructions   1.  Take all medications as directed. See attached handout for more information regarding your condition and how to manage it.  2.  Rest and keep yourself/patient well hydrated.  3.  You can alternate Tylenol and Motrin every 4-6 hours for fever above 100.4F and/or pain.   4. You should schedule a follow-up appointment with your Primary Care Provider for recheck in 2-3 days or as directed at this visit.   5.  If your condition fails to improve in a timely manner, you should receive another evaluation by your Primary Care Provider to discuss your concerns or return to urgent care for a recheck.  If your condition worsens at any time, you should report immediately to your nearest Emergency Department for further evaluation. **You must understand that you have received Urgent Care treatment only and that you may be released before all of your medical problems are known or treated. You, the patient, are responsible to arrange for follow-up care as instructed.

## 2025-05-14 LAB
OHS QRS DURATION: 82 MS
OHS QTC CALCULATION: 435 MS

## 2025-05-17 LAB
BACTERIA BLD CULT: NORMAL
BACTERIA BLD CULT: NORMAL